# Patient Record
Sex: FEMALE | Race: OTHER | Employment: PART TIME | URBAN - METROPOLITAN AREA
[De-identification: names, ages, dates, MRNs, and addresses within clinical notes are randomized per-mention and may not be internally consistent; named-entity substitution may affect disease eponyms.]

---

## 2018-05-26 ENCOUNTER — HOSPITAL ENCOUNTER (EMERGENCY)
Age: 43
Discharge: HOME | End: 2018-05-26
Attending: EMERGENCY MEDICINE
Payer: SELF-PAY

## 2018-05-26 VITALS
WEIGHT: 123 LBS | HEIGHT: 61 IN | HEART RATE: 99 BPM | SYSTOLIC BLOOD PRESSURE: 101 MMHG | BODY MASS INDEX: 23.22 KG/M2 | OXYGEN SATURATION: 100 % | DIASTOLIC BLOOD PRESSURE: 57 MMHG | TEMPERATURE: 98.2 F | RESPIRATION RATE: 16 BRPM

## 2018-05-26 DIAGNOSIS — R19.7 NAUSEA VOMITING AND DIARRHEA: Primary | ICD-10-CM

## 2018-05-26 DIAGNOSIS — R11.2 NAUSEA VOMITING AND DIARRHEA: Primary | ICD-10-CM

## 2018-05-26 LAB
ALBUMIN SERPL-MCNC: 3.8 G/DL (ref 3.5–5)
ALBUMIN/GLOB SERPL: 1 {RATIO} (ref 1.1–2.2)
ALP SERPL-CCNC: 63 U/L (ref 45–117)
ALT SERPL-CCNC: 46 U/L (ref 12–78)
ANION GAP SERPL CALC-SCNC: 10 MMOL/L (ref 5–15)
APPEARANCE UR: CLEAR
AST SERPL-CCNC: 36 U/L (ref 15–37)
BACTERIA URNS QL MICRO: NEGATIVE /HPF
BASOPHILS # BLD: 0 K/UL (ref 0–0.1)
BASOPHILS NFR BLD: 0 % (ref 0–1)
BILIRUB SERPL-MCNC: 0.4 MG/DL (ref 0.2–1)
BILIRUB UR QL: NEGATIVE
BUN SERPL-MCNC: 10 MG/DL (ref 6–20)
BUN/CREAT SERPL: 11 (ref 12–20)
CALCIUM SERPL-MCNC: 8.5 MG/DL (ref 8.5–10.1)
CHLORIDE SERPL-SCNC: 103 MMOL/L (ref 97–108)
CO2 SERPL-SCNC: 22 MMOL/L (ref 21–32)
COLOR UR: ABNORMAL
CREAT SERPL-MCNC: 0.95 MG/DL (ref 0.55–1.02)
DIFFERENTIAL METHOD BLD: ABNORMAL
EOSINOPHIL # BLD: 0 K/UL (ref 0–0.4)
EOSINOPHIL NFR BLD: 0 % (ref 0–7)
EPITH CASTS URNS QL MICRO: ABNORMAL /LPF
ERYTHROCYTE [DISTWIDTH] IN BLOOD BY AUTOMATED COUNT: 11.8 % (ref 11.5–14.5)
GLOBULIN SER CALC-MCNC: 3.8 G/DL (ref 2–4)
GLUCOSE SERPL-MCNC: 109 MG/DL (ref 65–100)
GLUCOSE UR STRIP.AUTO-MCNC: NEGATIVE MG/DL
HCG UR QL: NEGATIVE
HCT VFR BLD AUTO: 43.1 % (ref 35–47)
HGB BLD-MCNC: 14.5 G/DL (ref 11.5–16)
HGB UR QL STRIP: ABNORMAL
IMM GRANULOCYTES # BLD: 0 K/UL
IMM GRANULOCYTES NFR BLD AUTO: 0 %
KETONES UR QL STRIP.AUTO: NEGATIVE MG/DL
LEUKOCYTE ESTERASE UR QL STRIP.AUTO: NEGATIVE
LIPASE SERPL-CCNC: 70 U/L (ref 73–393)
LYMPHOCYTES # BLD: 0.2 K/UL (ref 0.8–3.5)
LYMPHOCYTES NFR BLD: 2 % (ref 12–49)
MCH RBC QN AUTO: 32.2 PG (ref 26–34)
MCHC RBC AUTO-ENTMCNC: 33.6 G/DL (ref 30–36.5)
MCV RBC AUTO: 95.6 FL (ref 80–99)
MONOCYTES # BLD: 0.2 K/UL (ref 0–1)
MONOCYTES NFR BLD: 2 % (ref 5–13)
NEUTS BAND NFR BLD MANUAL: 18 % (ref 0–6)
NEUTS SEG # BLD: 8.5 K/UL (ref 1.8–8)
NEUTS SEG NFR BLD: 78 % (ref 32–75)
NITRITE UR QL STRIP.AUTO: NEGATIVE
NRBC # BLD: 0 K/UL (ref 0–0.01)
NRBC BLD-RTO: 0 PER 100 WBC
PH UR STRIP: 5.5 [PH] (ref 5–8)
PLATELET # BLD AUTO: 194 K/UL (ref 150–400)
PMV BLD AUTO: 10.2 FL (ref 8.9–12.9)
POTASSIUM SERPL-SCNC: 3.8 MMOL/L (ref 3.5–5.1)
PROT SERPL-MCNC: 7.6 G/DL (ref 6.4–8.2)
PROT UR STRIP-MCNC: NEGATIVE MG/DL
RBC # BLD AUTO: 4.51 M/UL (ref 3.8–5.2)
RBC #/AREA URNS HPF: ABNORMAL /HPF (ref 0–5)
RBC MORPH BLD: ABNORMAL
SODIUM SERPL-SCNC: 135 MMOL/L (ref 136–145)
SP GR UR REFRACTOMETRY: 1.01 (ref 1–1.03)
UR CULT HOLD, URHOLD: NORMAL
UROBILINOGEN UR QL STRIP.AUTO: 0.2 EU/DL (ref 0.2–1)
WBC # BLD AUTO: 8.9 K/UL (ref 3.6–11)
WBC URNS QL MICRO: ABNORMAL /HPF (ref 0–4)

## 2018-05-26 PROCEDURE — 81001 URINALYSIS AUTO W/SCOPE: CPT | Performed by: PHYSICIAN ASSISTANT

## 2018-05-26 PROCEDURE — 96375 TX/PRO/DX INJ NEW DRUG ADDON: CPT

## 2018-05-26 PROCEDURE — 85025 COMPLETE CBC W/AUTO DIFF WBC: CPT | Performed by: PHYSICIAN ASSISTANT

## 2018-05-26 PROCEDURE — 36415 COLL VENOUS BLD VENIPUNCTURE: CPT | Performed by: PHYSICIAN ASSISTANT

## 2018-05-26 PROCEDURE — 74011250636 HC RX REV CODE- 250/636: Performed by: PHYSICIAN ASSISTANT

## 2018-05-26 PROCEDURE — 99282 EMERGENCY DEPT VISIT SF MDM: CPT

## 2018-05-26 PROCEDURE — 83690 ASSAY OF LIPASE: CPT | Performed by: PHYSICIAN ASSISTANT

## 2018-05-26 PROCEDURE — 96361 HYDRATE IV INFUSION ADD-ON: CPT

## 2018-05-26 PROCEDURE — 74011000250 HC RX REV CODE- 250: Performed by: PHYSICIAN ASSISTANT

## 2018-05-26 PROCEDURE — 81025 URINE PREGNANCY TEST: CPT

## 2018-05-26 PROCEDURE — 96372 THER/PROPH/DIAG INJ SC/IM: CPT

## 2018-05-26 PROCEDURE — 96374 THER/PROPH/DIAG INJ IV PUSH: CPT

## 2018-05-26 PROCEDURE — 80053 COMPREHEN METABOLIC PANEL: CPT | Performed by: PHYSICIAN ASSISTANT

## 2018-05-26 RX ORDER — ONDANSETRON 2 MG/ML
4 INJECTION INTRAMUSCULAR; INTRAVENOUS
Status: COMPLETED | OUTPATIENT
Start: 2018-05-26 | End: 2018-05-26

## 2018-05-26 RX ORDER — DICYCLOMINE HYDROCHLORIDE 10 MG/ML
20 INJECTION INTRAMUSCULAR
Status: COMPLETED | OUTPATIENT
Start: 2018-05-26 | End: 2018-05-26

## 2018-05-26 RX ORDER — ONDANSETRON 4 MG/1
4 TABLET, ORALLY DISINTEGRATING ORAL
Qty: 10 TAB | Refills: 0 | Status: SHIPPED | OUTPATIENT
Start: 2018-05-26

## 2018-05-26 RX ORDER — DICYCLOMINE HYDROCHLORIDE 10 MG/1
20 CAPSULE ORAL 4 TIMES DAILY
Qty: 20 CAP | Refills: 0 | Status: SHIPPED | OUTPATIENT
Start: 2018-05-26

## 2018-05-26 RX ADMIN — ONDANSETRON 4 MG: 2 INJECTION INTRAMUSCULAR; INTRAVENOUS at 13:07

## 2018-05-26 RX ADMIN — DICYCLOMINE HYDROCHLORIDE 20 MG: 10 INJECTION INTRAMUSCULAR at 13:28

## 2018-05-26 RX ADMIN — SODIUM CHLORIDE 1000 ML: 900 INJECTION, SOLUTION INTRAVENOUS at 13:00

## 2018-05-26 RX ADMIN — FAMOTIDINE 20 MG: 10 INJECTION INTRAVENOUS at 13:06

## 2018-05-26 NOTE — DISCHARGE INSTRUCTIONS
Dolor abdominal: Instrucciones de cuidado - [ Abdominal Pain: Care Instructions ]  Instrucciones de cuidado    El dolor abdominal tiene muchas causas posibles. Algunas de ellas no son graves y mejoran por sí solas en unos días. Otras requieren [place] y [place]. Si malone dolor continúa o [place], necesitará katrin nueva revisión y [place] pruebas para determinar qué pasa. Es posible que necesite cirugía para corregir el problema. No ignore nuevos síntomas, johan fiebre, náuseas y [place], [address] [facility], dolor que [place] o [place]. Podrían ser señales de un problema más grave. Malone médico puede haberle recomendado katrin consulta de [employer] Shayan las 8 o 12 horas siguientes. Si no se siente mejor, es posible que requiera [place] o [place]. El médico lo nuñez revisado minuciosamente, giacomo puede chandler problemas más tarde. Si nota algún problema o síntomas nuevos, busque tratamiento médico inmediatamente. La atención de seguimiento es katrin parte clave de malone tratamiento y seguridad. Asegúrese de hacer y acudir a todas las citas, y llame a malone médico si está teniendo problemas. También es katrin buena idea saber los resultados de los exámenes y mantener katrin lista de los medicamentos que otoniel. ¿Cómo puede cuidarse en el hogar? · Descanse hasta que se sienta mejor. · Para prevenir la deshidratación, peyton abundantes líquidos, suficientes para que malone orina sea de color amarillo nettie o transparente johan el agua. Elija beber agua y otros líquidos johnny sin cafeína hasta que se sienta mejor. Si tiene [employer], del corazón o del hígado y tiene que Flo's líquidos, hable con malone médico antes de aumentar malone consumo. · Si tiene [employer], coma alimentos suaves, johan arroz, pan iza seco o galletas saladas, bananas (plátanos) y puré de Synchari. Trate de comer varias comidas pequeñas al día en lugar de dos o krunal grandes.   · Espere hasta 50 horas después de que [employer] síntomas hayan desaparecido antes de comer alimentos condimentados, alcohol y bebidas que contengan cafeína. · No consuma alimentos ricos en grasa. · Evite medicamentos antiinflamatorios johan aspirina, ibuprofeno (Advil, Motrin) y naproxeno (Aleve). Pueden causar [employer] [employer]. Dígale a malone médico si está tomando aspirina diariamente debido a otro problema de tarun. ¿Cuándo debe pedir ayuda? Llame al 911 en cualquier momento que considere que necesita atención de emergencia. Por ejemplo, llame si:  ? · Se desmayó (perdió el conocimiento). ? · Las heces son de color rojizo o muy sanguinolentas (con mirna). ? · Vomita mirna o algo parecido a granos de café molido. ? · Tiene dolor abdominal nuevo e intenso. ? Llame a malone médico ahora mismo o busque atención médica inmediata si:  ? · Malone dolor empeora, sobre todo si se concentra en katrin serene parte del vientre. ? · Vuelve a tener fiebre o tiene fiebre más hayder. ? · Latrice heces son negruzcas y parecidas al alquitrán o tienen rastros de [place]. ? · Tiene sangrado vaginal inesperado. ? · Tiene síntomas de katrin infección del tracto urinario. Estos podrían incluir:  ¨ Dolor al [place]. ¨ Orinar con más frecuencia que lo habitual.  ¨ Mirna en la [place]. ? · [employer] o aturdimiento, o que está a punto de [place]. ?Preste especial atención a los cambios en malone tarun y asegúrese de comunicarse con malone médico si:  ? · No está mejorando después de 1 día (24 horas). ¿Dónde puede encontrar más información en inglés? Lobo Litter a http://jenn-keri.info/. Benjamin Fallow I462 en la búsqueda para aprender más acerca de \"Dolor abdominal: Instrucciones de cuidado - [ Abdominal Pain: Care Instructions ]. \"  Revisado: 20 Mirta Collier 2017  Versión del contenido: 11.4  © 8191-1124 Healthwise, [employer]. Las instrucciones de cuidado fueron adaptadas bajo licencia por [employer] (which disclaims liability or warranty for this information).  Si usted tiene [place] afección médica o sobre estas instrucciones, siempre pregunte a malone profesional de tarun. [employer] niega toda garantía o responsabilidad por malone uso de esta información. Diarrea: Instrucciones de cuidado - [ Diarrhea: Care Instructions ]  Instrucciones de cuidado    La diarrea es la evacuación de heces flojas y acuosas. Con frecuencia, la causa exacta es difícil de determinar. A veces, la diarrea es la manera que tiene el cuerpo de eliminar lo que le causó malestar estomacal. Los virus, la intoxicación por alimentos y muchos medicamentos pueden provocar diarrea. Algunas personas tienen diarrea johan respuesta al estrés emocional, la ansiedad o determinados alimentos. Graciela todos tenemos diarrea de vez en cuando. Por lo general, no es grave y las heces regresarán pronto a la normalidad. Lo importante es que debe reponer los líquidos perdidos para que pueda prevenir la deshidratación. El médico lo nuñez revisado minuciosamente, giacomo se pueden presentar problemas más tarde. Si nota algún problema o síntomas nuevos, busque tratamiento médico inmediatamente. La atención de seguimiento es katrin parte clave de malone tratamiento y seguridad. Asegúrese de hacer y acudir a todas las citas, y llame a malone médico si está teniendo problemas. También es katrin buena idea saber los resultados de los exámenes y mantener katrin lista de los medicamentos que otoniel. ¿Cómo puede cuidarse en el hogar? · Esté atento a señales de deshidratación, lo que significa que malone cuerpo nuñez perdido [facility]. La deshidratación es un problema médico grave y debe tratarse de inmediato. Las señales de la deshidratación son:  Marene Beat de sed y sequedad de la boca y los ojos. ¨ Sensación de [employer] o aturdimiento.   ¨ Orina más oscura y en martin cantidad de lo normal.  · Para prevenir la deshidratación, peyton abundantes líquidos, los suficientes para que malone orina sea de color amarillo nettie o transparente johan el [place] beber agua y otros líquidos johnny sin cafeína hasta que se sienta mejor. Si tiene [employer], del corazón o del hígado y tiene que Flo's líquidos, hable con malone médico antes de aumentar malone consumo. · Comience comiendo cantidades pequeñas de alimentos suaves al día siguiente, si tiene ganas. ¨ Pruebe con yogur que tenga cultivos vivos de lactobacilos. (Lo la etiqueta). ¨ Evite las comidas muy condimentadas, las frutas, el alcohol y la cafeína hasta 50 horas después de que desaparezcan todos los síntomas. ¨ Evite los chicles que contengan sorbitol. ¨ Evite los productos lácteos (excepto el yogur con lactobacilos) mientras tenga diarrea y seth 3 días después de que hayan desaparecido los síntomas. · El médico podría recomendarle que tome medicamentos de venta carri, johan loperamida (Imodium), si persiste la diarrea después de 6 horas. Lo y siga todas las instrucciones de la [place]. No use shawn medicamento si tiene diarrea sanguinolenta (con mirna), fiebre hayder u otras señales de enfermedad grave. Llame a malone médico si chirag estar teniendo problemas con malone medicamento. ¿Cuándo debe pedir ayuda? Llame al 911 en cualquier momento que considere que necesita atención de [place]. Por ejemplo, llame si:  ? · Se desmayó (perdió el conocimiento). ? · Las heces son de color rojizo o muy sanguinolentas (con mirna). ?Llame a malone médico ahora mismo o busque atención médica inmediata si:  ? · Siente mareo o aturdimiento, o siente que se va a desmayar. ? · Latrice heces son negruzcas y parecidas al alquitrán o tienen rastros de [place]. ? · Tiene dolor abdominal nuevo o peor. ? · Tiene síntomas de deshidratación, tales johan:  Stark Hait y ojos secos. ¨ [place] orina de color oscuro. ¨ Tener más sed de lo normal.   ? · Tiene fiebre nueva o más hayder. ?Preste especial atención a los cambios en malone tarun y asegúrese de comunicarse con malone médico si:  ? · La diarrea está empeorando. ? · Ve pus en la diarrea. ? · No está mejorando después de 2 días (48 horas). ¿Dónde puede encontrar más información en inglés? Andrzej Late a http://jenn-keri.info/. Maninder Strong N441 en la búsqueda para aprender Hanh Belch de \"Diarrea: Instrucciones de cuidado - [ Diarrhea: Care Instructions ]. \"  Revisado: 20 Dorys Jeanmarie 2017  Versión del contenido: 11.4  © 7451-5079 Healthwise, Incorporated. Las instrucciones de cuidado fueron adaptadas bajo licencia por [employer] (which disclaims liability or warranty for this information). Si usted tiene [place] afección médica o sobre estas instrucciones, siempre pregunte a malone profesional de tarun. Healthwise, Incorporated niega toda garantía o responsabilidad por malone uso de esta información. Náuseas y vómito: Instrucciones de cuidado - [ Nausea and Vomiting: Care Instructions ]  Instrucciones de cuidado    Cuando tiene náuseas, podría sentirse débil y sudoroso y notar mucha saliva en malone boca. Las náuseas suelen [employer]. La mayoría de las veces no hay que preocuparse por las náuseas y [address] [facility], giacomo estos pueden ser signos de [facility]. Dos causas comunes de náuseas y vómito son la gastroenteritis viral y la intoxicación por alimentos. Las náuseas y el vómito por gastroenteritis viral, por lo general, empiezan a mejorar en unas 24 horas. Las náuseas y el vómito debido a intoxicación por alimentos podrían durar de 12 a 48 horas. El médico lo ha revisado minuciosamente, giacomo puede desarrollar problemas más tarde. Si nota algún problema o síntomas nuevos, busque tratamiento médico inmediatamente. La atención de seguimiento es katrin parte clave de malone tratamiento y seguridad. Asegúrese de hacer y acudir a todas las citas, y llame a malone médico si está teniendo problemas. También es katrin buena idea saber los resultados de los exámenes y mantener katrin lista de los medicamentos que otoniel.   ¿Cómo puede cuidarse en el hogar?  · Para prevenir la deshidratación, peyton abundantes líquidos, suficientes para que malone orina sea de color amarillo nettie o cristina johan el agua. Opte por beber agua y otros líquidos johnny sin cafeína hasta que se sienta mejor. Si tiene katrin enfermedad del riñón, del corazón o del hígado y tiene que Flo's líquidos, hable con malone médico antes de aumentar malone consumo. · Permanezca en la cama hasta que se sienta mejor. · Cuando pueda comer, empiece a consumir sopas claras (caldos), alimentos suaves y líquidos hasta que todos los síntomas hayan desaparecido por un período de 12 a 48 horas. Otras elecciones buenas incluyen pan iza seco, galletas saladas, cereal cocido y postre de gelatina, johan Jell-O.  ¿Cuándo debe pedir ayuda? Llame al 911 toda vez que piense que puede necesitar atención de emergencia. Por ejemplo, llame si:  ? · Se desmayó (perdió el conocimiento). ?Llame a malone médico ahora mismo o busque atención médica inmediata si:  ? · Tiene síntomas de deshidratación, tales johan:  ¨ Ojos secos y boca seca. ¨ Orina solo poca cantidad de color oscuro. ¨ Tiene más sed de lo normal.   ? · Tiene dolor abdominal nuevo o que empeora. ? · Tiene fiebre nueva o que aumenta. ? · Vomita mirna o algo parecido a granos de café molido. ?Preste especial atención a los cambios en malone tarun y asegúrese de comunicarse con malone médico si:  ? · Tiene náusea y vómito continuos. ? · El vómito está empeorando. ? · El vómito dura más de 2 días. ? · No mejora johan se esperaba. ¿Dónde puede encontrar más información en inglés? Minor [place] a http://jenn-keri.info/. Clement Reed H591 en la búsqueda para aprender más acerca de \"Náuseas y vómito: Instrucciones de cuidado - [ Nausea and Vomiting: Care Instructions ]. \"  Revisado: 20 Dutch Beaumont 2017  Versión del contenido: 11.4  © 2930-2925 Healthwise, Incorporated.  Las instrucciones de cuidado fueron adaptadas bajo licencia por [employer] (which disclaims liability or warranty for this information). Si usted tiene [place] afección médica o sobre estas instrucciones, siempre pregunte a malone profesional de tarun. [employer] niega toda garantía o responsabilidad por malone uso de esta información. We hope that we have addressed all of your medical concerns. The examination and treatment you received in the Emergency Department were for an emergent problem and were not intended as complete care. It is important that you follow up with your healthcare provider(s) for ongoing care. If your symptoms worsen or do not improve as expected, and you are unable to reach your usual health care provider(s), you should return to the Emergency Department. Today's healthcare is undergoing tremendous change, and patient satisfaction surveys are one of the many tools to assess the quality of medical care. You may receive a survey from the [employer] regarding your experience in the Emergency Department. I hope that your experience has been completely positive, particularly the medical care that I provided. As such, please participate in the survey; anything less than excellent does not meet my expectations or intentions. [facility] and [address] [place] [address] participate in nationally recognized quality of care measures. If your blood pressure is greater than 120/80, as reported below, we urge that you seek medical care to address the potential of high blood pressure, commonly known as hypertension. Hypertension can be hereditary or can be caused by certain medical conditions, pain, stress, or \"white coat syndrome. \"       Please make an appointment with your health care provider(s) for follow up of your Emergency Department visit.        VITALS:   Patient Vitals for the past 8 hrs:   Temp Pulse Resp BP SpO2   05/26/18 1257 - - - - 100 %   05/26/18 1256 - - - 103/45 -   05/26/18 1239 98.2 °F (36.8 °C) 99 16 99/58 98 %          Thank you for allowing us to provide you with medical care today. We realize that you have many choices for your emergency care needs. Please choose us in the future for any continued health care needs. Matt Bryant, Via "[employer].   Office: [phone]            Recent Results (from the past 24 hour(s))   CBC WITH AUTOMATED DIFF    Collection Time: 05/26/18 12:58 PM   Result Value Ref Range    WBC 8.9 3.6 - 11.0 K/uL    RBC 4.51 3.80 - 5.20 M/uL    HGB 14.5 11.5 - 16.0 g/dL    HCT 43.1 35.0 - 47.0 %    MCV 95.6 80.0 - 99.0 FL    MCH 32.2 26.0 - 34.0 PG    MCHC 33.6 30.0 - 36.5 g/dL    RDW 11.8 11.5 - 14.5 %    PLATELET 468 029 - 677 K/uL    MPV 10.2 8.9 - 12.9 FL    NRBC 0.0 0  WBC    ABSOLUTE NRBC 0.00 0.00 - 0.01 K/uL    NEUTROPHILS 78 (H) 32 - 75 %    BAND NEUTROPHILS 18 (H) 0 - 6 %    LYMPHOCYTES 2 (L) 12 - 49 %    MONOCYTES 2 (L) 5 - 13 %    EOSINOPHILS 0 0 - 7 %    BASOPHILS 0 0 - 1 %    IMMATURE GRANULOCYTES 0 %    ABS. NEUTROPHILS 8.5 (H) 1.8 - 8.0 K/UL    ABS. LYMPHOCYTES 0.2 (L) 0.8 - 3.5 K/UL    ABS. MONOCYTES 0.2 0.0 - 1.0 K/UL    ABS. EOSINOPHILS 0.0 0.0 - 0.4 K/UL    ABS. BASOPHILS 0.0 0.0 - 0.1 K/UL    ABS. IMM. GRANS. 0.0 K/UL    DF MANUAL      RBC COMMENTS OVALOCYTES  PRESENT       METABOLIC PANEL, COMPREHENSIVE    Collection Time: 05/26/18 12:58 PM   Result Value Ref Range    Sodium 135 (L) 136 - 145 mmol/L    Potassium 3.8 3.5 - 5.1 mmol/L    Chloride 103 97 - 108 mmol/L    CO2 22 21 - 32 mmol/L    Anion gap 10 5 - 15 mmol/L    Glucose 109 (H) 65 - 100 mg/dL    BUN 10 6 - 20 MG/DL    Creatinine 0.95 0.55 - 1.02 MG/DL    BUN/Creatinine ratio 11 (L) 12 - 20      GFR est AA >60 >60 ml/min/1.73m2    GFR est non-AA >60 >60 ml/min/1.73m2    Calcium 8.5 8.5 - 10.1 MG/DL    Bilirubin, total 0.4 0.2 - 1.0 MG/DL    ALT (SGPT) 46 12 - 78 U/L    AST (SGOT) 36 15 - 37 U/L    Alk.  phosphatase 63 45 - 117 U/L Protein, total 7.6 6.4 - 8.2 g/dL    Albumin 3.8 3.5 - 5.0 g/dL    Globulin 3.8 2.0 - 4.0 g/dL    A-G Ratio 1.0 (L) 1.1 - 2.2     LIPASE    Collection Time: 05/26/18 12:58 PM   Result Value Ref Range    Lipase 70 (L) 73 - 393 U/L   URINALYSIS W/MICROSCOPIC    Collection Time: 05/26/18  1:29 PM   Result Value Ref Range    Color YELLOW/STRAW      Appearance CLEAR CLEAR      Specific gravity 1.012 1.003 - 1.030      pH (UA) 5.5 5.0 - 8.0      Protein NEGATIVE  NEG mg/dL    Glucose NEGATIVE  NEG mg/dL    Ketone NEGATIVE  NEG mg/dL    Bilirubin NEGATIVE  NEG      Blood SMALL (A) NEG      Urobilinogen 0.2 0.2 - 1.0 EU/dL    Nitrites NEGATIVE  NEG      Leukocyte Esterase NEGATIVE  NEG      WBC 5-10 0 - 4 /hpf    RBC 0-5 0 - 5 /hpf    Epithelial cells FEW FEW /lpf    Bacteria NEGATIVE  NEG /hpf   URINE CULTURE HOLD SAMPLE    Collection Time: 05/26/18  1:29 PM   Result Value Ref Range    Urine culture hold        URINE ON HOLD IN MICROBIOLOGY DEPT FOR 3 DAYS. IF UNPRESERVED URINE IS SUBMITTED, IT CANNOT BE USED FOR ADDITIONAL TESTING AFTER 24 HRS, RECOLLECTION WILL BE REQUIRED. HCG URINE, QL. - POC    Collection Time: 05/26/18  1:33 PM   Result Value Ref Range    Pregnancy test,urine (POC) NEGATIVE  NEG         No results found.

## 2018-05-26 NOTE — ED PROVIDER NOTES
HPI Comments: 40 y.o. female with no significant past medical history who presents via private vehicle with chief complaint of vomiting. Pt c/o nausea, vomiting and diarrhea for the past 3 days, accompanied by epigastric pain. Pt has had multiples episodes of vomiting and diarrhea each day. Pt has tried to drink pedialyte, but has been unable to keep it down. Denies fever, chills, CP, SOB. She denies urinary sx or rash. Her lmp was 5/25/18. She denies other tx pta. Pt denies sick contacts, but states she is a     There are no other acute medical concerns at this time. She denies smoking, etoh or drugs    Note written by Barron Toure, as dictated by  CHIDI Pickett 12:56 PM      The history is provided by the patient. No  was used. No past medical history on file. No past surgical history on file. No family history on file. Social History     Social History    Marital status: SINGLE     Spouse name: N/A    Number of children: N/A    Years of education: N/A     Occupational History    Not on file. Social History Main Topics    Smoking status: Not on file    Smokeless tobacco: Not on file    Alcohol use Not on file    Drug use: Not on file    Sexual activity: Not on file     Other Topics Concern    Not on file     Social History Narrative         ALLERGIES: Review of patient's allergies indicates no known allergies. Review of Systems   Constitutional: Negative. Negative for appetite change, chills, fever and unexpected weight change. HENT: Negative. Negative for congestion, ear pain, hearing loss, nosebleeds, rhinorrhea, sneezing, sore throat and trouble swallowing. Eyes: Negative for redness and visual disturbance. Respiratory: Negative. Negative for cough, chest tightness and shortness of breath. Cardiovascular: Negative. Negative for chest pain, palpitations and leg swelling.    Gastrointestinal: Positive for abdominal pain, diarrhea, nausea and vomiting. Negative for abdominal distention and blood in stool. Endocrine: Negative. Genitourinary: Negative for difficulty urinating, dysuria, frequency and hematuria. Musculoskeletal: Negative. Negative for back pain, myalgias and neck stiffness. Skin: Negative. Negative for rash. Allergic/Immunologic: Negative. Neurological: Negative. Negative for dizziness, syncope, weakness, numbness and headaches. Hematological: Negative. Negative for adenopathy. Psychiatric/Behavioral: Negative. Patient Vitals for the past 12 hrs:   Temp Pulse Resp BP SpO2   05/26/18 1435 - - - - 100 %   05/26/18 1430 - - - 101/57 100 %   05/26/18 1257 - - - - 100 %   05/26/18 1256 - - - 103/45 -   05/26/18 1239 98.2 °F (36.8 °C) 99 16 99/58 98 %              Physical Exam   Constitutional: She is oriented to person, place, and time. She appears well-developed and well-nourished. No distress. HENT:   Head: Normocephalic and atraumatic. Right Ear: External ear normal.   Left Ear: External ear normal.   Nose: Nose normal.   Eyes: EOM are normal. Pupils are equal, round, and reactive to light. Neck: Neck supple. Cardiovascular: Normal rate, regular rhythm, normal heart sounds and intact distal pulses. Exam reveals no gallop and no friction rub. No murmur heard. Pulmonary/Chest: Effort normal and breath sounds normal. No stridor. No respiratory distress. She has no wheezes. She has no rales. She exhibits no tenderness. Abdominal: Soft. Bowel sounds are normal. She exhibits no distension and no mass. There is tenderness. There is no rebound and no guarding. Diffuse abd TTP without rebounding or guarding. TTP more focal to epigastric areas. No cvat   Musculoskeletal: Normal range of motion. She exhibits no edema, tenderness or deformity. Neurological: She is alert and oriented to person, place, and time. No cranial nerve deficit. Coordination normal.   Skin: No rash noted. No erythema. No pallor. Psychiatric: She has a normal mood and affect. Her behavior is normal.   Nursing note and vitals reviewed. MDM  Number of Diagnoses or Management Options  Nausea vomiting and diarrhea:      Amount and/or Complexity of Data Reviewed  Clinical lab tests: reviewed and ordered  Tests in the medicine section of CPT®: ordered and reviewed  Obtain history from someone other than the patient: yes (family)  Review and summarize past medical records: yes  Independent visualization of images, tracings, or specimens: yes    Patient Progress  Patient progress: stable        ED Course       Procedures  1:20 PM  Discussed pt, sx, hx and current findings with Levon Whitfield. Miguel Jerez MD. He is in agreement with plan. Will check abd labs, urine and give fluids/ meds. Marcelo Matters. JULIO CÉSAR Bryant    2:35 PM  Tolerated pos. Feeling better. Will discharge home. Suspect viral syndrome. Discussed non acute labs. Marcelo Mcmanus. JULIO CÉSAR Bryant      LABORATORY TESTS:  Recent Results (from the past 12 hour(s))   CBC WITH AUTOMATED DIFF    Collection Time: 05/26/18 12:58 PM   Result Value Ref Range    WBC 8.9 3.6 - 11.0 K/uL    RBC 4.51 3.80 - 5.20 M/uL    HGB 14.5 11.5 - 16.0 g/dL    HCT 43.1 35.0 - 47.0 %    MCV 95.6 80.0 - 99.0 FL    MCH 32.2 26.0 - 34.0 PG    MCHC 33.6 30.0 - 36.5 g/dL    RDW 11.8 11.5 - 14.5 %    PLATELET 231 854 - 461 K/uL    MPV 10.2 8.9 - 12.9 FL    NRBC 0.0 0  WBC    ABSOLUTE NRBC 0.00 0.00 - 0.01 K/uL    NEUTROPHILS 78 (H) 32 - 75 %    BAND NEUTROPHILS 18 (H) 0 - 6 %    LYMPHOCYTES 2 (L) 12 - 49 %    MONOCYTES 2 (L) 5 - 13 %    EOSINOPHILS 0 0 - 7 %    BASOPHILS 0 0 - 1 %    IMMATURE GRANULOCYTES 0 %    ABS. NEUTROPHILS 8.5 (H) 1.8 - 8.0 K/UL    ABS. LYMPHOCYTES 0.2 (L) 0.8 - 3.5 K/UL    ABS. MONOCYTES 0.2 0.0 - 1.0 K/UL    ABS. EOSINOPHILS 0.0 0.0 - 0.4 K/UL    ABS. BASOPHILS 0.0 0.0 - 0.1 K/UL    ABS. IMM.  GRANS. 0.0 K/UL    DF MANUAL      RBC COMMENTS OVALOCYTES  PRESENT       METABOLIC PANEL, COMPREHENSIVE    Collection Time: 05/26/18 12:58 PM   Result Value Ref Range    Sodium 135 (L) 136 - 145 mmol/L    Potassium 3.8 3.5 - 5.1 mmol/L    Chloride 103 97 - 108 mmol/L    CO2 22 21 - 32 mmol/L    Anion gap 10 5 - 15 mmol/L    Glucose 109 (H) 65 - 100 mg/dL    BUN 10 6 - 20 MG/DL    Creatinine 0.95 0.55 - 1.02 MG/DL    BUN/Creatinine ratio 11 (L) 12 - 20      GFR est AA >60 >60 ml/min/1.73m2    GFR est non-AA >60 >60 ml/min/1.73m2    Calcium 8.5 8.5 - 10.1 MG/DL    Bilirubin, total 0.4 0.2 - 1.0 MG/DL    ALT (SGPT) 46 12 - 78 U/L    AST (SGOT) 36 15 - 37 U/L    Alk. phosphatase 63 45 - 117 U/L    Protein, total 7.6 6.4 - 8.2 g/dL    Albumin 3.8 3.5 - 5.0 g/dL    Globulin 3.8 2.0 - 4.0 g/dL    A-G Ratio 1.0 (L) 1.1 - 2.2     LIPASE    Collection Time: 05/26/18 12:58 PM   Result Value Ref Range    Lipase 70 (L) 73 - 393 U/L   URINALYSIS W/MICROSCOPIC    Collection Time: 05/26/18  1:29 PM   Result Value Ref Range    Color YELLOW/STRAW      Appearance CLEAR CLEAR      Specific gravity 1.012 1.003 - 1.030      pH (UA) 5.5 5.0 - 8.0      Protein NEGATIVE  NEG mg/dL    Glucose NEGATIVE  NEG mg/dL    Ketone NEGATIVE  NEG mg/dL    Bilirubin NEGATIVE  NEG      Blood SMALL (A) NEG      Urobilinogen 0.2 0.2 - 1.0 EU/dL    Nitrites NEGATIVE  NEG      Leukocyte Esterase NEGATIVE  NEG      WBC 5-10 0 - 4 /hpf    RBC 0-5 0 - 5 /hpf    Epithelial cells FEW FEW /lpf    Bacteria NEGATIVE  NEG /hpf   URINE CULTURE HOLD SAMPLE    Collection Time: 05/26/18  1:29 PM   Result Value Ref Range    Urine culture hold        URINE ON HOLD IN MICROBIOLOGY DEPT FOR 3 DAYS. IF UNPRESERVED URINE IS SUBMITTED, IT CANNOT BE USED FOR ADDITIONAL TESTING AFTER 24 HRS, RECOLLECTION WILL BE REQUIRED. HCG URINE, QL. - POC    Collection Time: 05/26/18  1:33 PM   Result Value Ref Range    Pregnancy test,urine (POC) NEGATIVE  NEG         IMAGING RESULTS:    No results found.     MEDICATIONS GIVEN:  Medications   sodium chloride 0.9 % bolus infusion 1,000 mL (0 mL IntraVENous IV Completed 5/26/18 1438)   dicyclomine (BENTYL) 10 mg/mL injection 20 mg (20 mg IntraMUSCular Given 5/26/18 1328)   famotidine (PF) (PEPCID) 20 mg in sodium chloride 0.9% 10 mL injection (20 mg IntraVENous Given 5/26/18 1306)   ondansetron (ZOFRAN) injection 4 mg (4 mg IntraVENous Given 5/26/18 1307)       IMPRESSION:  1. Nausea vomiting and diarrhea        PLAN:  1. Discharge Medication List as of 5/26/2018  2:34 PM      START taking these medications    Details   dicyclomine (BENTYL) 10 mg capsule Take 2 Caps by mouth four (4) times daily. , Print, Disp-20 Cap, R-0      ondansetron (ZOFRAN ODT) 4 mg disintegrating tablet Take 1 Tab by mouth every eight (8) hours as needed for Nausea. , Print, Disp-10 Tab, R-0           2. Follow-up Information     Follow up With Details Comments [address] [place] [address],[address] [place] [address] Schedule an appointment as soon as possible for a visit 2-4 days for recheck [place] [id number]  [phone]        Return to ED if worse       2:36 PM  Pt has been reexamined. Pt has no new complaints, changes or physical findings. Care plan outlined and precautions discussed. All available results were reviewed with pt. All medications were reviewed with pt. All of pt's questions and concerns were addressed. Pt agrees to F/U as instructed and agrees to return to ED upon further deterioration. Pt is ready to go home.   CHIDI Santiago